# Patient Record
Sex: MALE | Race: WHITE
[De-identification: names, ages, dates, MRNs, and addresses within clinical notes are randomized per-mention and may not be internally consistent; named-entity substitution may affect disease eponyms.]

---

## 2018-04-21 ENCOUNTER — HOSPITAL ENCOUNTER (EMERGENCY)
Dept: HOSPITAL 91 - FTE | Age: 33
Discharge: HOME | End: 2018-04-21
Payer: MEDICAID

## 2018-04-21 ENCOUNTER — HOSPITAL ENCOUNTER (EMERGENCY)
Age: 33
Discharge: HOME | End: 2018-04-21

## 2018-04-21 DIAGNOSIS — K29.70: Primary | ICD-10-CM

## 2018-04-21 LAB
ADD MAN DIFF?: NO
ADD UMIC: NO
ALANINE AMINOTRANSFERASE: 42 IU/L (ref 13–69)
ALBUMIN/GLOBULIN RATIO: 1.35
ALBUMIN: 4.2 G/DL (ref 3.3–4.9)
ALKALINE PHOSPHATASE: 100 IU/L (ref 42–121)
ANION GAP: 15 (ref 8–16)
ASPARTATE AMINO TRANSFERASE: 26 IU/L (ref 15–46)
BASOPHIL #: 0.1 10^3/UL (ref 0–0.1)
BASOPHILS %: 1.2 % (ref 0–2)
BILIRUBIN,DIRECT: 0 MG/DL (ref 0–0.2)
BILIRUBIN,TOTAL: 0.5 MG/DL (ref 0.2–1.3)
BLOOD UREA NITROGEN: 14 MG/DL (ref 7–20)
CALCIUM: 9.6 MG/DL (ref 8.4–10.2)
CARBON DIOXIDE: 31 MMOL/L (ref 21–31)
CHLORIDE: 102 MMOL/L (ref 97–110)
CREATININE: 0.94 MG/DL (ref 0.61–1.24)
EOSINOPHILS #: 0 10^3/UL (ref 0–0.5)
EOSINOPHILS %: 0.4 % (ref 0–7)
GLOBULIN: 3.1 G/DL (ref 1.3–3.2)
GLUCOSE: 82 MG/DL (ref 70–220)
HEMATOCRIT: 42.6 % (ref 42–52)
HEMOGLOBIN: 14.7 G/DL (ref 14–18)
LIPASE: 123 U/L (ref 23–300)
LYMPHOCYTES #: 2.3 10^3/UL (ref 0.8–2.9)
LYMPHOCYTES %: 33.6 % (ref 15–51)
MEAN CORPUSCULAR HEMOGLOBIN: 30.3 PG (ref 29–33)
MEAN CORPUSCULAR HGB CONC: 34.5 G/DL (ref 32–37)
MEAN CORPUSCULAR VOLUME: 87.8 FL (ref 82–101)
MEAN PLATELET VOLUME: 12.4 FL (ref 7.4–10.4)
MONOCYTE #: 0.6 10^3/UL (ref 0.3–0.9)
MONOCYTES %: 8.9 % (ref 0–11)
NEUTROPHIL #: 3.8 10^3/UL (ref 1.6–7.5)
NEUTROPHILS %: 55.5 % (ref 39–77)
NUCLEATED RED BLOOD CELLS #: 0 10^3/UL (ref 0–0)
NUCLEATED RED BLOOD CELLS%: 0 /100WBC (ref 0–0)
PLATELET COUNT: 180 10^3/UL (ref 140–415)
POTASSIUM: 4 MMOL/L (ref 3.5–5.1)
RED BLOOD COUNT: 4.85 10^6/UL (ref 4.7–6.1)
RED CELL DISTRIBUTION WIDTH: 12.7 % (ref 11.5–14.5)
SODIUM: 144 MMOL/L (ref 135–144)
TOTAL PROTEIN: 7.3 G/DL (ref 6.1–8.1)
UR ASCORBIC ACID: NEGATIVE MG/DL
UR BILIRUBIN (DIP): NEGATIVE MG/DL
UR BLOOD (DIP): NEGATIVE MG/DL
UR CLARITY: CLEAR
UR COLOR: YELLOW
UR GLUCOSE (DIP): NEGATIVE MG/DL
UR KETONES (DIP): NEGATIVE MG/DL
UR LEUKOCYTE ESTERASE (DIP): NEGATIVE LEU/UL
UR NITRITE (DIP): NEGATIVE MG/DL
UR PH (DIP): 6 (ref 5–9)
UR SPECIFIC GRAVITY (DIP): 1.01 (ref 1–1.03)
UR TOTAL PROTEIN (DIP): NEGATIVE MG/DL
UR UROBILINOGEN (DIP): NEGATIVE MG/DL
WHITE BLOOD COUNT: 6.9 10^3/UL (ref 4.8–10.8)

## 2018-04-21 PROCEDURE — 99284 EMERGENCY DEPT VISIT MOD MDM: CPT

## 2018-04-21 PROCEDURE — 81003 URINALYSIS AUTO W/O SCOPE: CPT

## 2018-04-21 PROCEDURE — 76705 ECHO EXAM OF ABDOMEN: CPT

## 2018-04-21 PROCEDURE — 85025 COMPLETE CBC W/AUTO DIFF WBC: CPT

## 2018-04-21 PROCEDURE — 83690 ASSAY OF LIPASE: CPT

## 2018-04-21 PROCEDURE — 80053 COMPREHEN METABOLIC PANEL: CPT

## 2018-04-21 PROCEDURE — 36415 COLL VENOUS BLD VENIPUNCTURE: CPT

## 2018-04-21 RX ADMIN — ALUMINUM HYDROXIDE, MAGNESIUM HYDROXIDE, DIMETHICONE 1 ML: 200; 200; 20 SUSPENSION ORAL at 21:40

## 2018-04-21 RX ADMIN — FAMOTIDINE 1 MG: 20 TABLET ORAL at 21:41

## 2019-06-19 ENCOUNTER — HOSPITAL ENCOUNTER (EMERGENCY)
Dept: HOSPITAL 91 - FTE | Age: 34
Discharge: HOME | End: 2019-06-19
Payer: MEDICAID

## 2019-06-19 ENCOUNTER — HOSPITAL ENCOUNTER (EMERGENCY)
Dept: HOSPITAL 10 - FTE | Age: 34
Discharge: HOME | End: 2019-06-19
Payer: MEDICAID

## 2019-06-19 VITALS — RESPIRATION RATE: 18 BRPM | HEART RATE: 69 BPM | DIASTOLIC BLOOD PRESSURE: 75 MMHG | SYSTOLIC BLOOD PRESSURE: 140 MMHG

## 2019-06-19 VITALS
WEIGHT: 164.24 LBS | HEIGHT: 64 IN | HEIGHT: 64 IN | WEIGHT: 164.24 LBS | BODY MASS INDEX: 28.04 KG/M2 | BODY MASS INDEX: 28.04 KG/M2

## 2019-06-19 DIAGNOSIS — H60.501: Primary | ICD-10-CM

## 2019-06-19 PROCEDURE — 99283 EMERGENCY DEPT VISIT LOW MDM: CPT

## 2019-06-19 NOTE — ERD
ER Documentation


Chief Complaint


Chief Complaint





right ear pain





HPI


This is a 33-year-old male presents ED with right ear pain x3 days.  Denies 


fevers, chills, runny nose, sore throat, chest pain, shortness breath, trouble 


breathing and all other symptoms.  Denies history of diabetes mellitus.





ROS


All systems reviewed and are negative except as per history of present illness.





Medications


Home Meds


Active Scripts


Ofloxacin Otic (Ofloxacin Otic) 5 Ml Drops, 10 DROP RIGHT EAR DAILY for 7 Days, 


#1 BOTTLE


   Prov:MOHIT LOCKHART PA-C         6/19/19


Acetaminophen* (Tylenol*) 325 Mg Tablet, 2 TAB PO Q8 PRN for PAIN AND OR 


ELEVATED TEMP, #20 TAB


   Prov:CATHERINE MCCONNELL MD         4/21/18


Ranitidine Hcl* (Zantac*) 150 Mg Tablet, 150 MG PO BID PRN for EPIGASTRIC PAIN, 


#30 TAB


   Prov:CATHERINE MCCONNELL MD         4/21/18


Famotidine* (Famotidine*) 40 Mg Tablet, 40 MG PO BID, #60 TAB


   Prov:SIN MOE PA-C         8/14/16


Aspirin-Acetaminophen-Caffeine* (Excedrin Extra Strength*) 250-250-65 Mg Tablet,


1 TAB PO Q6H PRN for PAIN, #30 TAB


   Prov:SIN MOE PA-C         8/14/16





Allergies


Allergies:  


Coded Allergies:  


     No Known Allergy (Unverified , 8/14/16)





PMhx/Soc


History of Surgery:  No


Anesthesia Reaction:  No


Hx Neurological Disorder:  No


Hx Respiratory Disorders:  No


Hx Cardiac Disorders:  No


Hx Psychiatric Problems:  No


Hx Miscellaneous Medical Probl:  No


Hx Alcohol Use:  No


Hx Substance Use:  No


Hx Tobacco Use:  No


Smoking Status:  Never smoker





FmHx


Family History:  No diabetes





Physical Exam


Vitals





Vital Signs


  Date      Temp  Pulse  Resp  B/P (MAP)   Pulse Ox  O2          O2 Flow    FiO2


Time                                                 Delivery    Rate


   6/19/19  98.2     69    18      140/75        99


     15:17                           (96)





Physical Exam


Const:   No acute distress


Head:   Atraumatic 


Eyes:    Normal Conjunctiva


ENT:    Normal External Ears, Nose and Mouth.  When palpating the right tragus 


elicits pain, right external auditory canal is slightly edematous with white 


discharge noted in the canal, tympanic membrane is not visualized due to 


discharge, left TM is visualized and is not bulging, erythematous with no 


purulent air-fluid line seen, oropharynx is clear without any tonsillar 


adenopathy, exudate or erythema, normal nasal mucosa


Neck:               Full range of motion. No meningismus.


Resp:   Clear to auscultation bilaterally


Cardio:   Regular rate and rhythm, no murmurs


Neur:   Awake and alert


Psych:    Normal Mood and Affect





Procedures/MDM


ER COURSE:





The patient was stable throughout ED course.





I kept the patient and/or family informed of laboratory and diagnostic imaging 


results throughout the emergency room course.





The patient was promptly evaluated and a treatment plan was devised based on H&P


and other data. This plan was discussed with the patient who agreed and had no 


further questions or concerns prior to discharge.








MEDICAL DECISION MAKING:





This is a 33-year-old male presents ED with otitis externa.  At this time there 


is no ENT emergency.  No evidence of mastoiditis, sepsis, meningitis, among 


others.  Vitals are stable patient can be managed close outpatient follow-up.  


Advised patient follow-up with primary care in the next 48 hours.  Return to ED 


with any worsening symptoms





DISPOSITION PLAN:





We discussed follow up with the patient's primary care doctor within 24 to 48 


hours.  Patient counseled regarding my diagnostic impression and care plan. 


Prior to discharge all questions answered. Pt agrees with treatment plan and 


understands strict return precautions. Precautionary instructions provided 


including instructions to return to the ER if not improving or for any worsening


or changing symptoms or concerns.





ExitCare instructions provided.





Prior to discharge, patients vital signs have been reviewed








SPECIALIST FOLLOW UP RECOMMENDED: None





Patient has been advised to follow up with primary care in 1-2 days.








Disclaimer: Inadvertent spelling and grammatical errors are likely due to 


EHR/dictation software use and do not reflect on the overall quality of patient 


care. Also, please note that the electronic time recorded on this note does not 


necessarily reflect the actual time of the patient encounter.








Blood Pressure Assessment: Patient's blood pressure was elevated (>120/80) but 


appears stable without evidence of hypertension emergency or urgency.  The 


patient was counseled about the risks of hypertension and urged to pursue 


outpatient monitoring and therapy within a week with their primary care 


physician.





Departure


Diagnosis:  


   Primary Impression:  


   Otitis externa


   Otitis externa type:  unspecified type  Chronicity:  acute  Laterality:  


   right  Qualified Codes:  H60.501 - Unspecified acute noninfective otitis 


   externa, right ear


Condition:  Stable


Patient Instructions:  External Ear Infection (Adult)


Referrals:  


[facility]


Usted se ha hecho un examen mdico de control que le indica que no est en kimi 


condicin que requiera tratamiento urgente en el Departamento de Emergencia. Un 


estudio ms profundo y el tratamiento de davalos condicin pueden esperar sin ningn 


riesgo hasta que usted sea atendida/o en el consultorio de davalos mdico o kimi 


clnica. Es responsabilidad suya arreglar kimi jonn para el seguimiento del xiao.








MANEJO DE CONDICIONES NO URGENTES EN EL FUTURO


1) Si usted tiene un mdico de atencin primaria:





Usted debera llamar a davalos mdico de atencin primaria antes de venir al 


departamento de emergencia. Despus de las horas de consultorio, davalos doctor o davalos 


asociado/a est disponible por telfono. El mdico o enfermero de jabari en el 


servicio telefnico puede asesorarle por cecily medio para atender el problema, o 


xiao contrario se puede programar kimi jonn.





2) Si usted no tiene un mdico de atencin primaria:


Llame al mdico o clnica de referencia que aparece abajo mona las horas de 


consultorio para hacer kimi jonn para que le vean.





CLINICAS:


[facility]  [phone] [address] [facility]  [phone] [address] [facility] [phone]


[address] [facility]. [facility]  [phone]


[address] [facility]   [phone] [address] [facility]. [phone] 


[address]





Additional Instructions:  


Paciente aconseja volver a Departamento de urgencias inmediatamente para 


sntomas nuevos o que empeoran . Paciente aconseja posteriores con el PCP en 1-2


mckee . Paciente verbaliza la comprehensin y est de acuerdo con el tratamiento 


y el curso de accin.





Si el paciente no tiene ninguna de atencin primaria pueden seguir con





[facility]


[address] [place] [address]





o





[facility] + [address] [address]











MOHIT LOCKHART PA-C          Jun 19, 2019 16:36